# Patient Record
Sex: FEMALE | Race: WHITE
[De-identification: names, ages, dates, MRNs, and addresses within clinical notes are randomized per-mention and may not be internally consistent; named-entity substitution may affect disease eponyms.]

---

## 2018-11-21 ENCOUNTER — HOSPITAL ENCOUNTER (EMERGENCY)
Dept: HOSPITAL 74 - JER | Age: 58
Discharge: HOME | End: 2018-11-21
Payer: COMMERCIAL

## 2018-11-21 VITALS — BODY MASS INDEX: 24.5 KG/M2

## 2018-11-21 VITALS — SYSTOLIC BLOOD PRESSURE: 133 MMHG | DIASTOLIC BLOOD PRESSURE: 79 MMHG | TEMPERATURE: 97.6 F | HEART RATE: 88 BPM

## 2018-11-21 DIAGNOSIS — Z79.84: ICD-10-CM

## 2018-11-21 DIAGNOSIS — I10: ICD-10-CM

## 2018-11-21 DIAGNOSIS — K08.89: Primary | ICD-10-CM

## 2018-11-21 DIAGNOSIS — E11.9: ICD-10-CM

## 2018-11-21 LAB
ANION GAP SERPL CALC-SCNC: 7 MMOL/L (ref 8–16)
BASOPHILS # BLD: 0.5 % (ref 0–2)
BUN SERPL-MCNC: 11 MG/DL (ref 7–18)
CALCIUM SERPL-MCNC: 9.6 MG/DL (ref 8.5–10.1)
CHLORIDE SERPL-SCNC: 102 MMOL/L (ref 98–107)
CO2 SERPL-SCNC: 28 MMOL/L (ref 21–32)
CREAT SERPL-MCNC: 0.7 MG/DL (ref 0.55–1.3)
DEPRECATED RDW RBC AUTO: 13.8 % (ref 11.6–15.6)
EOSINOPHIL # BLD: 2.8 % (ref 0–4.5)
GLUCOSE SERPL-MCNC: 187 MG/DL (ref 74–106)
HCT VFR BLD CALC: 38.6 % (ref 32.4–45.2)
HGB BLD-MCNC: 12.7 GM/DL (ref 10.7–15.3)
LYMPHOCYTES # BLD: 36.3 % (ref 8–40)
MCH RBC QN AUTO: 30.1 PG (ref 25.7–33.7)
MCHC RBC AUTO-ENTMCNC: 32.8 G/DL (ref 32–36)
MCV RBC: 91.7 FL (ref 80–96)
MONOCYTES # BLD AUTO: 8.9 % (ref 3.8–10.2)
NEUTROPHILS # BLD: 51.5 % (ref 42.8–82.8)
PLATELET # BLD AUTO: 308 K/MM3 (ref 134–434)
PMV BLD: 8.5 FL (ref 7.5–11.1)
POTASSIUM SERPLBLD-SCNC: 4 MMOL/L (ref 3.5–5.1)
RBC # BLD AUTO: 4.21 M/MM3 (ref 3.6–5.2)
SODIUM SERPL-SCNC: 137 MMOL/L (ref 136–145)
WBC # BLD AUTO: 6.4 K/MM3 (ref 4–10)

## 2018-11-21 NOTE — PDOC
History of Present Illness





- General


Chief Complaint: Pain


Stated Complaint: L SIDED FACIAL PAIN


Time Seen by Provider: 11/21/18 07:46


History Source: Patient,  Used





- History of Present Illness


Initial Comments: 





11/21/18 08:15


HPI/ROS/PE performed with phone  ([id number])





58 year old female with PMH HTN, DM presented to ED for left sided tooth pain. 

She stated that she had 1 lower left wisdom tooth removed 10/31 and since then 

she has had pain. She was seen by her dentist, Dr. Leyva, yesterday and was 

prescribed 600 mg ibuprofen and amoxicillin. Pt reports her pain has not 

improved. She denied fever, chills, discharge from gums, vomiting. She admitted 

to nausea and pain. 











Past History





- Past Medical History


Allergies/Adverse Reactions: 


 Allergies











Allergy/AdvReac Type Severity Reaction Status Date / Time


 


No Known Allergies Allergy   Verified 11/21/18 07:31











Home Medications: 


Ambulatory Orders





Amoxicillin - [Amoxicillin 500mg Capsule -] 500 mg PO TID 11/21/18 


Aspirin [Adult Aspirin] 81 mg PO DAILY 11/21/18 


Ibuprofen [Motrin -] 600 mg PO TID PRN 11/21/18 


Losartan Potassium [Cozaar -] 25 mg PO DAILY 11/21/18 


Metformin HCl [Glucophage] 1,000 mg PO BID 11/21/18 








COPD: No


Diabetes: Yes





- Immunization History


Immunization Up to Date: Yes





- Suicide/Smoking/Psychosocial Hx


Smoking History: Never smoked


Hx Alcohol Use: No


Drug/Substance Use Hx: No





**Review of Systems





- Review of Systems


Able to Perform ROS?: Yes


Comments:: 





11/21/18 08:17


General: denies fever, chills, night sweats, generalized weakness.


HEENT: admitted to dental pain. denies sore throat, rhinorrhea, ear pain.


Heart: denies chest pain, palpitations, syncope, lower extremity swelling, 

diaphoresis.


Respiratory: denies shortness of breath, cough, sputum production, hemoptysis.


Abdomen: admitted to nausea. denies abdominal pain, vomiting, diarrhea, 

constipation, blood in stool.


: denies dysuria, increased urinary frequency, hematuria, urinary incontinence

, flank pain.


Back: denies back pain.


Musculoskeletal: denies joint pain, muscle pain, joint swelling.


Neurological: denies headache, dizziness, numbness, tingling, weakness. 


Skin: denies rash, laceration, abrasion.








*Physical Exam





- Vital Signs


 Last Vital Signs











Temp Pulse Resp BP Pulse Ox


 


 97.6 F   88   18   133/79   98 


 


 11/21/18 07:19  11/21/18 07:19  11/21/18 07:19  11/21/18 07:19  11/21/18 07:19














- Physical Exam


Comments: 





11/21/18 08:18


Constitutional: Well-nourished, Well-developed, appearing stated age.


HEENT: head is normocephalic, atraumatic. EOMI. PERRLA. tenderness to palpation 

of left face. 


Mouth: poor dentition. left lower last tooth missing, no erythema, no swelling, 

no discharge. tenderness to palpation of left lower teeth, left cheek. 


Neck: supple. Full ROM.


Heart: regular rhythm. no murmurs, rubs or gallops. 


Lungs: clear to auscultation bilaterally. no crackles, rhonchi or wheezing. no 

stridor.


Abdomen: soft, nontender. normal bowel sounds. no rebound, guarding, masses. 


Extremities: Peripheral pulses intact. No lower extremity edema.


Neurological: CN 2-12 grossly intact. Moves all four extremities. 


Psych: awake, alert, oriented x3. Follows commands. Answers questions 

appropriately. 








ED Treatment Course





- LABORATORY


CBC & Chemistry Diagram: 


 11/21/18 08:40





 11/21/18 08:40





Medical Decision Making





- Medical Decision Making





11/21/18 08:19


58 year old female with PMH HTN, DM presented to ED for dental pain. 


Pt was seen by dentist, Dr. Leyva, yesterday for pain. Was prescribed 

amoxicillin and ibuprofen 600 mg. 





 Initial Vital Signs











Temp Pulse Resp BP Pulse Ox


 


 97.6 F   88   18   133/79   98 


 


 11/21/18 07:19  11/21/18 07:19  11/21/18 07:19  11/21/18 07:19  11/21/18 07:19








Afebrile. No tachycardia. No tachypnea. Mild hypertension. No hypoxia on room 

air. 





Tylenol PO 1000 mg ordered. 


CT soft tissue neck with IV contrast ordered. 


Pending CBC, CMP. 





11/21/18 10:01


Pt reassessed, sleeping. Upon awakening pt reported improvement of pain. 





 CBC











WBC  6.4 K/mm3 (4.0-10.0)   11/21/18  08:40    


 


RBC  4.21 M/mm3 (3.60-5.2)   11/21/18  08:40    


 


Hgb  12.7 GM/dL (10.7-15.3)   11/21/18  08:40    


 


Hct  38.6 % (32.4-45.2)   11/21/18  08:40    


 


MCV  91.7 fl (80-96)   11/21/18  08:40    


 


MCH  30.1 pg (25.7-33.7)   11/21/18  08:40    


 


MCHC  32.8 g/dl (32.0-36.0)   11/21/18  08:40    


 


RDW  13.8 % (11.6-15.6)   11/21/18  08:40    


 


Plt Count  308 K/MM3 (134-434)   11/21/18  08:40    


 


MPV  8.5 fl (7.5-11.1)   11/21/18  08:40    


 


Absolute Neuts (auto)  3.3 K/mm3 (1.5-8.0)   11/21/18  08:40    


 


Neutrophils %  51.5 % (42.8-82.8)   11/21/18  08:40    


 


Lymphocytes %  36.3 % (8-40)   11/21/18  08:40    


 


Monocytes %  8.9 % (3.8-10.2)   11/21/18  08:40    


 


Eosinophils %  2.8 % (0-4.5)   11/21/18  08:40    


 


Basophils %  0.5 % (0-2.0)   11/21/18  08:40    


 


Nucleated RBC %  0 % (0-0)   11/21/18  08:40    








No leukocytosis. No anemia. 





 CMP











Sodium  137 mmol/L (136-145)   11/21/18  08:40    


 


Potassium  4.0 mmol/L (3.5-5.1)   11/21/18  08:40    


 


Chloride  102 mmol/L ()   11/21/18  08:40    


 


Carbon Dioxide  28 mmol/L (21-32)   11/21/18  08:40    


 


Anion Gap  7 MMOL/L (8-16)  L  11/21/18  08:40    


 


BUN  11 mg/dL (7-18)   11/21/18  08:40    


 


Creatinine  0.7 mg/dL (0.55-1.3)   11/21/18  08:40    


 


Creat Clearance w eGFR  > 60  (>60)   11/21/18  08:40    


 


Random Glucose  187 mg/dL ()  H  11/21/18  08:40    


 


Calcium  9.6 mg/dL (8.5-10.1)   11/21/18  08:40    








No electrolyte abnormalities. No JERMAINE. 


11/21/18 11:39


Ct soft tissue neck report: no evidence of neck or facial cellulitis. no 

abscess seen. no evidence of prevertebral soft tissue swelling, retropharyngeal 

fluid collection or edema. no dominant neck mass is seen. no evidence of neck 

adenopathy. nonspecific right submanibular lymph node with fat in hilum of 

lymph node. 





Dental pain without abscess. Pain improved with tylenol. 


Pt informed of results. 


Pt to be discharged with tylenol #3 and dentist follow up. 








*DC/Admit/Observation/Transfer


Diagnosis at time of Disposition: 


 Pain, dental








- Discharge Dispostion


Disposition: HOME


Condition at time of disposition: Stable


Decision to Admit order: No





- Referrals


Referrals: 


Carmita Leyva MD [Primary Care Provider] - 





- Patient Instructions


Printed Discharge Instructions:  DI for Dental Pain


Additional Instructions: 


You were seen today for dental pain. Your blood work was normal. Your cat-scan 

was normal. I have sent a prescription for a stronger pain medication to your 

pharmacy, take as advised on label, do not drive or operate heavy machinery 

with this medication. Follow up with your dentist in 1-2 days, call their 

office today and ask for the soonest appointment. Return to the Emergency 

Department for increasing pain, vomiting, discharge from tooth, fever, chills, 

lightheadedness like you may pass out, chest pain, shortness of breath or any 

other new, worsening or concerning symptoms. continue taking the medications 

prescribed to you by your dentist. 





Te vieron hoy por dolor dental. Howard anlisis de shaq era normal. Tu escaneo de 

aleena fue normal He enviado kylah receta para un medicamento para el dolor ms 

iesha a howard farmacia, tome las indicaciones de la etiqueta, no conduzca ni 

maneje maquinaria pesada con sade medicamento. Michelle un seguimiento con howard 

dentista en 1 o 2 johnson, llame a howard consultorio hoy y solicite la festus ms 

rpida. Regrese al Departamento de Emergencias para aumentar el dolor, vmitos, 

secreciones de los dientes, fiebre, escalofros, mareos chuy si pudiera 

desmayarse, dolor de pecho, falta de aire o cualquier otro sntoma nuevo, que 

empeore o relacionado. Contine tomando los medicamentos recetados por howard 

dentista.


Print Language: Macedonian





- Post Discharge Activity


Forms/Work/School Notes:  Back to Work

## 2018-11-21 NOTE — PDOC
Attending Attestation





- [facility] [place]


HPI: 


11/21/18 09:24


The patient is a 58-year-old female with a past medical history significant for 

DM and HTN presents to the emergency department with L. jaw pain. The patient 

reports shes been having left sided facial pain for the past 8 months, without 

difficulty eating. The patient reports following up with her then PCP, who did 

a CT that was unremarkable. The patient reports following up with current PCP, 

Dr. Leyva, who suggest the pain might be originating from the L. molar. The 

patient reports seeing her dentist, who did a tooth extraction on October 31st. 

The patient reports since shes been having increased pain to the L. jaw with 

difficulty eating. The patient states she was seen by the dentist a day prior, 

who prescribed the patient amoxicillin and 600 mg Ibuprofen. The patient 

reports she decided to be evaluated secondary to increased pain and pain with 

biting down. 





Denies fever, chills, cough, difficulty breathing, nausea, vomiting, pain with 

swallowing, drainage from the extraction site. Denies chest pain. 





Allergies: NKDA


Social history:  No past or present use of tobacco, alcohol, or recreational 

drugs


Surgical history: 


PCP: Carmita Mclean MD





- Physicial Exam


PE: 


11/21/18 09:24


GENERAL: The patient is in no acute distress.


HEAD: Normal with no signs of trauma.


EYES: PERRLA, EOMI, sclera anicteric, conjunctiva clear.


ENT: +Submandibular swelling on the L. socket with visible opening, no swelling 

or drainage noted. Ears normal, nares patent, oropharynx clear without 

exudates. Moist mucous membranes.


NECK: No neck stiffness. Normal range of motion, supple without lymphadenopathy

, JVD, or masses.


LUNGS: No difficulty breathing. Breath sounds equal, clear to auscultation 

bilaterally. No


wheezes, and no crackles.


HEART:Regular rate and rhythm, normal S1 and S2 without murmur, rub or gallop.


ABDOMEN: Soft, nontender, normoactive bowel sounds. No guarding, no


rebound. No masses palpable.


EXTREMITIES: Normal range of motion, no edema. No clubbing or


cyanosis. No erythema, or tenderness.


NEUROLOGICAL: Cranial nerves II through XII grossly intact. Normal


speech. No focal neurological deficits.


MUSCULOSKELETAL: Back non-tender to palpation, no CVA tenderness


SKIN: Warm, Dry, normal turgor, no rashes or lesions noted.





- Medical Decision Making


11/21/18 09:29


Documentation prepared by Eulalia Hand, acting as medical scribe for Rosalia Geller MD. 





<Eulalia Hand - Last Filed: 11/21/18 09:24>





- Resident


Resident Name: Senait Romeo





- ED Attending Attestation


I have performed the following: I have examined & evaluated the patient, The 

case was reviewed & discussed with the resident, I agree w/resident's findings 

& plan, Exceptions are as noted





- Medical Decision Making





11/21/18 08:31


59 yo F h/o DM with facial pain s/p molar extraction almost 3 weeks ago


now with left facial pain


No fevers or chills


No neck stiffness


Of note pt states he hpi began 8 months ago with left facial pain


Work up by PMD reportedly was negative


Pt seen by Dr Leyva who referred her to the dentist for dental extraction 

which she had on 10/31


Was seen in follow up yesterday by dentist who initiated 





?chronic facial pain, mandibular infection/abscess, Dry socket





Will do:


Labs


CT facial bones with IV contrast


(? jeremy apical collection?)


Will give pain medications





11/21/18 10:04


 Laboratory Tests











  11/21/18 11/21/18





  08:40 08:40


 


WBC  6.4 


 


Hgb  12.7 


 


Hct  38.6 


 


Plt Count  308 


 


BUN   11


 


Creatinine   0.7











11/21/18 11:39


CT neg for abscess


Possible dry socket





<Rosalia Geller - Last Filed: 11/21/18 11:49>